# Patient Record
Sex: MALE | Race: WHITE | NOT HISPANIC OR LATINO | Employment: FULL TIME | URBAN - METROPOLITAN AREA
[De-identification: names, ages, dates, MRNs, and addresses within clinical notes are randomized per-mention and may not be internally consistent; named-entity substitution may affect disease eponyms.]

---

## 2018-06-22 ENCOUNTER — DOCUMENTATION (OUTPATIENT)
Dept: BARIATRICS/WEIGHT MGMT | Facility: CLINIC | Age: 53
End: 2018-06-22

## 2018-06-22 DIAGNOSIS — R53.83 FATIGUE, UNSPECIFIED TYPE: Primary | ICD-10-CM

## 2018-06-22 DIAGNOSIS — R06.00 DYSPNEA, UNSPECIFIED TYPE: ICD-10-CM

## 2018-06-22 RX ORDER — OMEPRAZOLE 20 MG/1
20 CAPSULE, DELAYED RELEASE ORAL DAILY
COMMUNITY

## 2018-06-22 RX ORDER — HYDROCHLOROTHIAZIDE 12.5 MG/1
12.5 CAPSULE, GELATIN COATED ORAL DAILY
COMMUNITY

## 2018-06-22 RX ORDER — LOSARTAN POTASSIUM 100 MG/1
100 TABLET ORAL DAILY
COMMUNITY

## 2018-06-22 RX ORDER — ESCITALOPRAM OXALATE 20 MG/1
20 TABLET ORAL DAILY
COMMUNITY

## 2018-06-27 DIAGNOSIS — R53.83 FATIGUE, UNSPECIFIED TYPE: ICD-10-CM

## 2018-06-27 DIAGNOSIS — R06.00 DYSPNEA, UNSPECIFIED TYPE: ICD-10-CM

## 2018-06-28 ENCOUNTER — DOCUMENTATION (OUTPATIENT)
Dept: BARIATRICS/WEIGHT MGMT | Facility: CLINIC | Age: 53
End: 2018-06-28

## 2018-06-28 ENCOUNTER — OFFICE VISIT (OUTPATIENT)
Dept: BARIATRICS/WEIGHT MGMT | Facility: CLINIC | Age: 53
End: 2018-06-28

## 2018-06-28 VITALS
SYSTOLIC BLOOD PRESSURE: 131 MMHG | OXYGEN SATURATION: 99 % | BODY MASS INDEX: 40.43 KG/M2 | HEIGHT: 74 IN | HEART RATE: 89 BPM | DIASTOLIC BLOOD PRESSURE: 79 MMHG | WEIGHT: 315 LBS | TEMPERATURE: 98.4 F | RESPIRATION RATE: 18 BRPM

## 2018-06-28 DIAGNOSIS — R10.13 DYSPEPSIA: ICD-10-CM

## 2018-06-28 DIAGNOSIS — R10.13 DYSPEPSIA: Primary | ICD-10-CM

## 2018-06-28 DIAGNOSIS — R53.83 FATIGUE, UNSPECIFIED TYPE: ICD-10-CM

## 2018-06-28 DIAGNOSIS — I10 HYPERTENSION, UNSPECIFIED TYPE: ICD-10-CM

## 2018-06-28 DIAGNOSIS — E66.01 MORBID OBESITY (HCC): ICD-10-CM

## 2018-06-28 DIAGNOSIS — K21.9 GASTROESOPHAGEAL REFLUX DISEASE, ESOPHAGITIS PRESENCE NOT SPECIFIED: Primary | ICD-10-CM

## 2018-06-28 LAB
ALBUMIN SERPL-MCNC: 4.58 G/DL (ref 3.2–4.8)
ALBUMIN/GLOB SERPL: 1.5 G/DL (ref 1.5–2.5)
ALP SERPL-CCNC: 73 U/L (ref 25–100)
ALT SERPL-CCNC: 77 U/L (ref 7–40)
AST SERPL-CCNC: 56 U/L (ref 0–33)
BILIRUB SERPL-MCNC: 0.3 MG/DL (ref 0.3–1.2)
BUN SERPL-MCNC: 17 MG/DL (ref 9–23)
BUN/CREAT SERPL: 15.9 (ref 7–25)
CALCIUM SERPL-MCNC: 9.4 MG/DL (ref 8.7–10.4)
CHLORIDE SERPL-SCNC: 104 MMOL/L (ref 99–109)
CHOLEST SERPL-MCNC: 165 MG/DL (ref 0–200)
CO2 SERPL-SCNC: 29 MMOL/L (ref 20–31)
CREAT SERPL-MCNC: 1.07 MG/DL (ref 0.6–1.3)
ERYTHROCYTE [DISTWIDTH] IN BLOOD BY AUTOMATED COUNT: 13.4 % (ref 11.3–14.5)
GLOBULIN SER CALC-MCNC: 3.1 GM/DL
GLUCOSE SERPL-MCNC: 109 MG/DL (ref 70–100)
HCT VFR BLD AUTO: 45 % (ref 38.9–50.9)
HDLC SERPL-MCNC: 41 MG/DL (ref 40–60)
HGB BLD-MCNC: 14.3 G/DL (ref 13.1–17.5)
LDLC SERPL CALC-MCNC: 96 MG/DL (ref 0–100)
MCH RBC QN AUTO: 29.3 PG (ref 27–31)
MCHC RBC AUTO-ENTMCNC: 31.8 G/DL (ref 32–36)
MCV RBC AUTO: 92.2 FL (ref 80–99)
PLATELET # BLD AUTO: 232 10*3/MM3 (ref 150–450)
POTASSIUM SERPL-SCNC: 4.4 MMOL/L (ref 3.5–5.5)
PROT SERPL-MCNC: 7.7 G/DL (ref 5.7–8.2)
RBC # BLD AUTO: 4.88 10*6/MM3 (ref 4.2–5.76)
SODIUM SERPL-SCNC: 139 MMOL/L (ref 132–146)
TRIGL SERPL-MCNC: 140 MG/DL (ref 0–150)
TSH SERPL DL<=0.005 MIU/L-ACNC: 2.33 MIU/ML (ref 0.35–5.35)
VLDLC SERPL CALC-MCNC: 28 MG/DL
WBC # BLD AUTO: 8.76 10*3/MM3 (ref 3.5–10.8)

## 2018-06-28 PROCEDURE — 99204 OFFICE O/P NEW MOD 45 MIN: CPT | Performed by: PHYSICIAN ASSISTANT

## 2018-06-28 RX ORDER — CITALOPRAM 20 MG/1
20 TABLET ORAL DAILY
COMMUNITY
End: 2018-06-28

## 2018-06-28 NOTE — PROGRESS NOTES
"Weight Loss Surgery  Presurgical Nutrition Assessment     Maykel Ornelas  06/28/2018  [id number]  [id number]  1965  male    Surgery desired: Sleeve Gastrectomy    Ht 188 cm (74\"); Wt 145 kg (320.5 #); BMI 41.2  Past Medical History:   Diagnosis Date   • Depression    • Dyspepsia    • Dyspnea on exertion    • Fatigue    • Former smoker     quit 2010   • GERD (gastroesophageal reflux disease)     Omeprazole q2-3 days, denies prior eval   • Hypertension    • Morbid obesity    • Vitamin D deficiency      Past Surgical History:   Procedure Laterality Date   • APPENDECTOMY OPEN  1992   • CHOLECYSTECTOMY OPEN  1996    stones + gangrene   • COLONOSCOPY  2015    benign polyps   • KNEE ARTHROSCOPY Left 2008    meniscal repair   • SHOULDER SURGERY Left 2000    ganglion cyst removed   • SPINAL FUSION  1990    cracked vertebra w/ lifting injury   • UMBILICAL HERNIA REPAIR  2016     Allergies   Allergen Reactions   • Penicillins Unknown (See Comments)     Uncertain if can tolerate Keflex.       Current Outpatient Prescriptions:   •  Cholecalciferol (VITAMIN D3) 5000 units capsule capsule, Take 5,000 Units by mouth Daily., Disp: , Rfl:   •  escitalopram (LEXAPRO) 20 MG tablet, Take 20 mg by mouth Daily., Disp: , Rfl:   •  hydrochlorothiazide (MICROZIDE) 12.5 MG capsule, Take 12.5 mg by mouth Daily., Disp: , Rfl:   •  losartan (COZAAR) 100 MG tablet, Take 100 mg by mouth Daily., Disp: , Rfl:   •  omeprazole (priLOSEC) 20 MG capsule, Take 20 mg by mouth Daily., Disp: , Rfl:       Nutrition Assessment    Estimated energy needs:  2375 kcal    Estimated calories for weight loss:  1800 kcal    IBW (Pounds):  195 #        Excess body weight (Pounds):  125 #       Nutrition Recall  24 Hour recall: (B) (L) (D) -  Reviewed and discussed with patient.  Ingesting ~ 80 g protein qd in representative food hx.  Diet high in processed carb, and obtained carb from fruit at lunch (2 bananas & 2 oranges).  Brkfast @ 7:15 am = 2 biscuits /c gravy " & 2 eggs.  Water to drink.  Lunch in addition to fruit = 2 PB sandwiches /c ~ 3 Tbsps PB on each.  Dinner @ 7 pm was 2 fast food cheeseburgers /c order of fries & a large Coke.  Normally drinks 2-3 12-oz cans of Pepsi qd.         Exercise  never - no planned activity - c/o shoulder, knee and back pain.  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      Education    Provided information packet re:  Sleeve Gastrectomy  1. Reviewed guidelines for higher protein, limited carbohydrate diet to promote weight loss.  Encouraged patient to incorporate these principles of healthy eating from now until approximately 2 weeks prior to bariatric surgery date, when an even lower carbohydrate “liver-shrinking” regimen will be followed. (Information sheet re pre-op diet given).  Explained that after recovery from surgery this diet will again be followed to ensure further loss and for weight maintenance.    2. Encouraged patient to choose an acceptable protein supplement powder or shake for post-surgery liquid diet.  Provided product guidelines and examples.    3. Explained importance of goal setting to help in changing eating behaviors that are not conducive to weight loss.  Targeted several on a worksheet which also included spaces for patient to work on issues specific to them.  4. Provided follow-up options for support, including contact information for dietitians here, if desired.  Web-based support information and apps for smart phones and computers given.  Noted that monthly support group is offered at this clinic, and that support is associated with successful weight loss.    Recommend that team proceed with surgery and follow per protocol.      Nutrition Goals   Dietary Guidelines per information packet as described above  Protein goal:  grams per day   Carbohydrate goal:  100-140 grams per day  Eliminate soda, sweet tea, etc.     Exercise Goals  Continue current exercise routine   Add 15-30 minutes of activity  per day as tolerated      Bindu Weathers, ALEXA  06/28/2018  5:28 PM

## 2018-06-28 NOTE — PROGRESS NOTES
[facility]  [address]  [facility] [address]  [phone]      Patient  Name:  Maykel Ornelas  :  1965      Date of Visit: 2018      Chief Complaint:  weight gain; unable to maintain weight loss    History of Present Illness:  Maykel Ornelas is a 52 y.o. male who presents today for evaluation, education and consultation regarding weight loss surgery. The patient is interested in sleeve gastrectomy.     Maykel has been overweight for at least 30 years, has been 35 pounds or more overweight for at least 30 years, has been 100 pounds or more overweight for 10 or more years and started dieting at age 50.   Says weight gain really became an issue when he stopped smoking 7+years ago.    Previous diet attempts include: Ionamin/Adipex.  The most weight Maykel lost was 35 pounds on weight loss meds but was only able to maintain that weight loss for 6 months.  His maximum lifetime weight is 321 pounds.    As above, patient has been overweight for many years, with numerous failed dietary/weight loss attempts.  He now has obesity related comorbidities (HTN, GERD) and as such has decided to pursue weight loss surgery.    All past medical, surgical, social and family history have been obtained and discussed today, as pertinent to bariatric surgery.     Past Medical History:   Diagnosis Date   • Depression    • Dyspepsia    • Dyspnea on exertion    • Fatigue    • Former smoker     quit    • GERD (gastroesophageal reflux disease)     Omeprazole q2-3 days, denies prior eval   • Hypertension    • Morbid obesity    • Vitamin D deficiency      Past Surgical History:   Procedure Laterality Date   • APPENDECTOMY OPEN     • CHOLECYSTECTOMY OPEN      stones + gangrene   • COLONOSCOPY      benign polyps   • KNEE ARTHROSCOPY Left     meniscal repair   • SHOULDER SURGERY Left     ganglion cyst removed   • SPINAL FUSION      cracked vertebra w/ lifting  injury   • UMBILICAL HERNIA REPAIR  2016       Allergies   Allergen Reactions   • Penicillins Unknown (See Comments)     Uncertain if can tolerate Keflex.       Current Outpatient Prescriptions:   •  Cholecalciferol (VITAMIN D3) 5000 units capsule capsule, Take 5,000 Units by mouth Daily., Disp: , Rfl:   •  escitalopram (LEXAPRO) 20 MG tablet, Take 20 mg by mouth Daily., Disp: , Rfl:   •  hydrochlorothiazide (MICROZIDE) 12.5 MG capsule, Take 12.5 mg by mouth Daily., Disp: , Rfl:   •  losartan (COZAAR) 100 MG tablet, Take 100 mg by mouth Daily., Disp: , Rfl:   •  omeprazole (priLOSEC) 20 MG capsule, Take 20 mg by mouth Daily., Disp: , Rfl:     Social History     Social History   • Marital status:      Spouse name: N/A   • Number of children: N/A   • Years of education: N/A     Occupational History   • Not on file.     Social History Main Topics   • Smoking status: Former Smoker     Packs/day: 1.00     Years: 20.00     Quit date: 2010   • Smokeless tobacco: Never Used   • Alcohol use No   • Drug use: No   • Sexual activity: Not on file     Other Topics Concern   • Not on file     Social History Narrative    Living in [place], [place] w/ his wife.   for the [employer]     Family History   Problem Relation Age of Onset   • Breast cancer Mother        Review of Systems:  Constitutional:  reports fatigue, weight gain and denies fevers, chills.  HEENT:  denies headache, ear pain or loss of hearing, blurred or double vision, nasal discharge or sore throat.  Cardiovascular:  reports HTN and denies hx heart disease, chest pain, palpitations, hx DVT.  Respiratory:  denies cough , wheezing, sleep apnea, asthma, hx PE.  Gastrointestinal:  reports heartburn and denies nausea, vomiting, abdominal pain, IBS.  Genitourinary:  denies hematuria, dysuria, renal insufficiency.    Musculoskeletal:  reports joint pain, back pain, arthritis and denies autoimmune disease.  Neurological:  denies dizziness,  confusion, seizure, stroke.  Psychiatric:  reports depression and denies anxiety, bipolar disorder.  Endocrine: denies glucose intolerance, diabetes, thyroid disease.  Hematologic:  denies anemia, bleeding disorder.  Skin:  denies rashes, hx MRSA.    Physical Exam:  Vital Signs:  Weight: (!) 145 kg (320 lb 8.2 oz)   Body mass index is 41.15 kg/m².  Temp: 98.4 °F (36.9 °C)   Heart Rate: 89   BP: 131/79     Physical Exam   Constitutional: He is oriented to person, place, and time. He appears well-developed and well-nourished.   HENT:   Head: Normocephalic and atraumatic.   Eyes: Conjunctivae are normal. No scleral icterus.   Neck: Neck supple. No thyromegaly present.   Cardiovascular: Normal rate and regular rhythm.    No murmur heard.  Pulmonary/Chest: Effort normal and breath sounds normal. No respiratory distress. He has no wheezes. He has no rales.   Abdominal: Soft. Bowel sounds are normal. He exhibits no distension and no mass. There is no tenderness. No hernia.   scars: open yvette, open appy, umbilical   Musculoskeletal: Normal range of motion. He exhibits no edema.   Neurological: He is alert and oriented to person, place, and time. Gait normal.   Skin: Skin is warm and dry. No rash noted.   tattoos on upper extremeties   Psychiatric: He has a normal mood and affect. Judgment normal.   Vitals reviewed.      Patient Active Problem List   Diagnosis   • Morbid obesity   • Fatigue   • Dyspepsia   • Dyspnea on exertion   • Hypertension   • GERD (gastroesophageal reflux disease)   • Depression   • Vitamin D deficiency   • Former smoker       Assessment:    Maykel Ornelas is a 52 y.o. male with medically complicated obesity pursuing sleeve gastrectomy.    Weight loss surgery is deemed medically necessary given the following obesity related comorbidities including hypertension and GERD with current Weight: (!) 145 kg (320 lb 8.2 oz) and Body mass index is 41.15 kg/m².    Plan:  The consultation plan and program  requirements were reviewed with the patient.  The patient has been advised that a letter of medical support must be obtained from his primary care physician or referring provider. A psychological evaluation will be arranged.  A nutritional evaluation will be performed.  The patient was advised to start a high protein and low carbohydrate diet.  Necessary lifestyle modifications were discussed.  Instructions on how to access DOUGLAS was given to the patient.  DOUGLAS is an internet based educational video that explains the surgical procedure chosen and answers basic questions regarding that procedure.     Preoperative testing will include: CBC, CMP, Lipids, TSH, H.Pylori stool, Pulmonary Function Testing, CXR, EKG and EGD.    Will obtain his umbilical hernia repair op note for our records.     Additional preop clearances required prior to surgery: Cardiac.      The patient has been educated on expected postoperative lifestyle changes, including commitment to high protein diet, vitamin regimen, and exercise program.  They are aware that support groups are encouraged for optimal weight loss results. Patient understands that bariatric surgery is not cosmetic surgery but rather a tool to help make a lifelong commitment to lifestyle changes including diet, exercise, behavior modifications, and healthy habits. The procedure was discussed with the patient and all questions were answered. The importance of avoiding ASA/ NSAIDS/ steroids/ tobacco/ hormones/ immunomodulators perioperatively was discussed.       MOY Brooks